# Patient Record
Sex: MALE
[De-identification: names, ages, dates, MRNs, and addresses within clinical notes are randomized per-mention and may not be internally consistent; named-entity substitution may affect disease eponyms.]

---

## 2019-02-27 ENCOUNTER — HOSPITAL ENCOUNTER (INPATIENT)
Dept: HOSPITAL 92 - ERS | Age: 19
LOS: 6 days | Discharge: HOME | DRG: 853 | End: 2019-03-05
Attending: FAMILY MEDICINE | Admitting: FAMILY MEDICINE
Payer: SELF-PAY

## 2019-02-27 VITALS — BODY MASS INDEX: 34.7 KG/M2

## 2019-02-27 DIAGNOSIS — E66.9: ICD-10-CM

## 2019-02-27 DIAGNOSIS — E87.6: ICD-10-CM

## 2019-02-27 DIAGNOSIS — J18.9: ICD-10-CM

## 2019-02-27 DIAGNOSIS — J86.9: ICD-10-CM

## 2019-02-27 DIAGNOSIS — J96.01: ICD-10-CM

## 2019-02-27 DIAGNOSIS — R65.20: ICD-10-CM

## 2019-02-27 DIAGNOSIS — K76.0: ICD-10-CM

## 2019-02-27 DIAGNOSIS — A41.9: Primary | ICD-10-CM

## 2019-02-27 DIAGNOSIS — G47.33: ICD-10-CM

## 2019-02-27 DIAGNOSIS — M94.0: ICD-10-CM

## 2019-02-27 DIAGNOSIS — E86.0: ICD-10-CM

## 2019-02-27 LAB
ALBUMIN SERPL BCG-MCNC: 4.6 G/DL (ref 3.5–5)
ALP SERPL-CCNC: 84 U/L (ref 40–150)
ALT SERPL W P-5'-P-CCNC: 35 U/L (ref 8–55)
ANION GAP SERPL CALC-SCNC: 18 MMOL/L (ref 10–20)
AST SERPL-CCNC: 16 U/L (ref 5–34)
BICARBONATE (HCO3V): 23 MMOL/L (ref 22–28)
BILIRUB SERPL-MCNC: 2.1 MG/DL (ref 0.2–1.2)
BUN SERPL-MCNC: 16 MG/DL (ref 8.9–20.6)
CA-I BLD-SCNC: 1.06 MMOL/L
CALCIUM SERPL-MCNC: 10.6 MG/DL (ref 7.8–10.44)
CHLORIDE SERPL-SCNC: 103 MMOL/L (ref 98–107)
CHLORIDE SERPL-SCNC: 99 MMOL/L (ref 98–107)
CK SERPL-CCNC: 78 U/L (ref 30–200)
CO2 BLDV CALC-SCNC: 24.2 MMOL/L (ref 22–28)
CO2 SERPL-SCNC: 21 MMOL/L (ref 22–29)
CO2 TENSION (PVCO2): 38.3 MMHG (ref 40–50)
CREAT CL PREDICTED SERPL C-G-VRATE: 0 ML/MIN (ref 70–130)
GLOBULIN SER CALC-MCNC: 4.6 G/DL (ref 2.4–3.5)
GLUCOSE SERPL-MCNC: 111 MG/DL (ref 70–105)
HCT VFR BLD CALC: 45 % (ref 42–52)
HEMOGLOBIN - CALC: 15.4 G/DL (ref 14–18)
HGB BLD-MCNC: 16.4 G/DL (ref 14–18)
HIV 1/2 INDEX: 0.42 S/CO (ref ?–1)
LIPASE SERPL-CCNC: (no result) U/L (ref 8–78)
MCH RBC QN AUTO: 30.3 PG (ref 27–31)
MCV RBC AUTO: 91.3 FL (ref 78–98)
MDIFF COMPLETE?: YES
O2 TENSION (PVO2): 30.6 MMHG (ref 35–45)
PLATELET # BLD AUTO: 323 THOU/UL (ref 130–400)
POTASSIUM SERPL-SCNC: 3.8 MMOL/L (ref 3.5–5.1)
POTASSIUM SERPL-SCNC: 3.8 MMOL/L (ref 3.5–5.1)
RBC # BLD AUTO: 5.39 MILL/UL (ref 4.7–6.1)
S PNEUM AG UR QL: NEGATIVE
SAO2 % BLDV FROM PO2: 58.2 % (ref 60–85)
SODIUM SERPL-SCNC: 134 MMOL/L (ref 136–145)
SODIUM SERPL-SCNC: 136 MMOL/L (ref 138–145)
SP GR UR STRIP: 1.05 (ref 1–1.04)
WBC # BLD AUTO: 20.7 THOU/UL (ref 4.8–10.8)

## 2019-02-27 PROCEDURE — 76705 ECHO EXAM OF ABDOMEN: CPT

## 2019-02-27 PROCEDURE — 81003 URINALYSIS AUTO W/O SCOPE: CPT

## 2019-02-27 PROCEDURE — 83605 ASSAY OF LACTIC ACID: CPT

## 2019-02-27 PROCEDURE — 82330 ASSAY OF CALCIUM: CPT

## 2019-02-27 PROCEDURE — 71250 CT THORAX DX C-: CPT

## 2019-02-27 PROCEDURE — 36416 COLLJ CAPILLARY BLOOD SPEC: CPT

## 2019-02-27 PROCEDURE — 83690 ASSAY OF LIPASE: CPT

## 2019-02-27 PROCEDURE — 93306 TTE W/DOPPLER COMPLETE: CPT

## 2019-02-27 PROCEDURE — 87086 URINE CULTURE/COLONY COUNT: CPT

## 2019-02-27 PROCEDURE — 87040 BLOOD CULTURE FOR BACTERIA: CPT

## 2019-02-27 PROCEDURE — 84145 PROCALCITONIN (PCT): CPT

## 2019-02-27 PROCEDURE — 80053 COMPREHEN METABOLIC PANEL: CPT

## 2019-02-27 PROCEDURE — 87149 DNA/RNA DIRECT PROBE: CPT

## 2019-02-27 PROCEDURE — 94640 AIRWAY INHALATION TREATMENT: CPT

## 2019-02-27 PROCEDURE — 87899 AGENT NOS ASSAY W/OPTIC: CPT

## 2019-02-27 PROCEDURE — 85025 COMPLETE CBC W/AUTO DIFF WBC: CPT

## 2019-02-27 PROCEDURE — 71046 X-RAY EXAM CHEST 2 VIEWS: CPT

## 2019-02-27 PROCEDURE — 71275 CT ANGIOGRAPHY CHEST: CPT

## 2019-02-27 PROCEDURE — 80048 BASIC METABOLIC PNL TOTAL CA: CPT

## 2019-02-27 PROCEDURE — 80306 DRUG TEST PRSMV INSTRMNT: CPT

## 2019-02-27 PROCEDURE — 87389 HIV-1 AG W/HIV-1&-2 AB AG IA: CPT

## 2019-02-27 PROCEDURE — 96361 HYDRATE IV INFUSION ADD-ON: CPT

## 2019-02-27 PROCEDURE — 93005 ELECTROCARDIOGRAM TRACING: CPT

## 2019-02-27 PROCEDURE — 36415 COLL VENOUS BLD VENIPUNCTURE: CPT

## 2019-02-27 PROCEDURE — 82550 ASSAY OF CK (CPK): CPT

## 2019-02-27 PROCEDURE — 87804 INFLUENZA ASSAY W/OPTIC: CPT

## 2019-02-27 PROCEDURE — 82803 BLOOD GASES ANY COMBINATION: CPT

## 2019-02-27 PROCEDURE — 87205 SMEAR GRAM STAIN: CPT

## 2019-02-27 PROCEDURE — 71045 X-RAY EXAM CHEST 1 VIEW: CPT

## 2019-02-27 PROCEDURE — 87070 CULTURE OTHR SPECIMN AEROBIC: CPT

## 2019-02-27 PROCEDURE — 96365 THER/PROPH/DIAG IV INF INIT: CPT

## 2019-02-27 PROCEDURE — 96375 TX/PRO/DX INJ NEW DRUG ADDON: CPT

## 2019-02-27 NOTE — RAD
SINGLE VIEW CHEST:

 

HISTORY:

Dyspnea and back pain.

 

COMPARISON:

01/12/2012

 

FINDINGS:

A single view of the chest shows a normal sized cardiomediastinal silhouette.  Low lung volumes are p
resent.  There is elevation of the right hemidiaphragm.  There is no evidence of consolidation, mass,
 or pleural effusion.

 

IMPRESSION:

No evidence of acute cardiopulmonary disease.

 

POS: [facility]

## 2019-02-27 NOTE — ULT
GALLBLADDER ULTRASOUND:

 

Date:  02/27/19 

 

HISTORY:  

Right upper quadrant pain. 

 

FINDINGS:

Real-time imaging of the right upper quadrant was performed. This shows a normal appearing gallbladde
r. No gallstones identified. The common duct is 2-3 mm. Liver measures approximately 18.0 cm in lengt
h and is of diffuse increased echogenicity. There is some focal fatty sparing adjacent to the gallbla
dder. Right kidney is normal in size and not obstructed. The pancreas is totally obscured. 

 

IMPRESSION: 

Diffuse fatty change of the liver. 

 

 

POS: [facility]

## 2019-02-27 NOTE — PDOC.EVN
Event Note





- Event Note


Event Note: 


Date/Time: 02/27/19 7675





I personally evaluated the patient and discussed the management with Dr. Mckee


I agree with the History, Examination, Assessment and Plan documented above 

with any addition or exceptions noted below- 18 yo M with no PMH presents with 

2 week hx of cough productive of white sputum and 2-3 day history of lower 

posterior chest wall pain. States he had 1 episode of vomiting on Sunday and 

has not felt hungry or thirsty since then. UTD vaccines. Was seen in urgent 

care yesterday and diagnosed with low back pain and given NSAIDs and muscle 

relaxers for pain with no relief. No ill contacts. Subj fevers. PMH/PSH/All/

Meds reviewed and agree with resident's documentation. Tm 101.4 in ER   p148  

/58  RR24  90% RA Exam repeated by me and agree with resident's findings. 

Labs: WBC=20.7, H/H=16.4/49.2, Sis=443, GN=475, K=3.8, CL=99, CO2=21, BUN/Cr=16/

1.13, Gluc=111, Lactic acid=3.8 -> 2.8, AST/ALT=16/35, Procal=1.05, Urine Strep 

pneumo Ag (-); CTA chest- mild right pleural effusion, RLL infiltrate. A/P: 1) 

Sepsis secondary to CAP- Continue IVF. Continue Vanc/cefepime. Blood, urine 

cultures pending. Continue nebs.

## 2019-02-27 NOTE — PDOC.FPRHP
- History of Present Illness


Chief Complaint: dyspnea, fever


History of Present Illness: 





18 yo M with no PMH presents for dyspnea and fever. Patient states that it 

began 2 weeks ago with cough and congestion. 4 days ago, he had a sharp pain in 

his back, so he went to  [facility] and was given muscle relaxers which helped 

some. He also starting having post-tussive vomiting. He reports he did not 

urinate at all yesterday. His cough and dyspnea have been worsening, so much so 

that today he has pleuritic chest pain worse on his right side (anterior, 

lateral, and posterior). 





On presentation to ED, he was tachypneic to 40s, febrile 100.5, tachycardic to 

170s, with a high WBC of 20.7. LA was elevated at 3.8. CT chest showed RLL 

pneumonia and pleural effusion. He was given Vanc, zosyn, tylenol, 4L NS, UA 

neg. CT chest ruled out PE. 





- Allergies/Adverse Reactions


 Allergies











Allergy/AdvReac Type Severity Reaction Status Date / Time


 


No Known Drug Allergies Allergy   Verified 02/27/19 21:49














- Home Medications


 











 Medication  Instructions  Recorded  Confirmed  Type


 


Acetaminophen With Codeine 1 tablet PO Q6HR PRN 02/27/19 02/27/19 History





[Tylenol with Codeine #3]    


 


Cyclobenzaprine [Flexeril] 10 mg PO TID PRN 02/27/19 02/27/19 History














- History


PMHx: None


 


PSHx: None





FHx: No heart disease, DM or cancer


 


Social: Denies tobacco, alcohol, or drug use


 








- Review of Systems


General: reports: fever/chills, weight/appetite/sleep changes (decreased 

appetite)


ENT: reports: other (sore throat).  denies: nasal congestion, rhinorrhea


Respiratory: reports: cough, congestion, shortness of breath


Cardiovascular: reports: chest pain.  denies: palpitation, edema


Gastrointestinal: reports: vomiting.  denies: nausea, diarrhea, constipation, 

abdominal pain, GI bleeding


Genitourinary: denies: dysuria, other (hematuria)


Skin: denies: rashes, lesions


Musculoskeletal: reports: pain (back pain).  denies: tenderness


Neurological: reports: numbness (chest numbness on right side).  denies: 

weakness


Psychological: denies: anxiety, depression





- Vital signs


BP: [128/76]  HR: [135] RR: [48] Tmax: [100.5] Pox: [96]% on [RA]  Wt: [107.1] 

  








- Physical Exam


Constitutional: awake, alert and oriented, well developed, other (tachypneic, 

diaphoretic)


HEENT: normocephalic and atraumatic, PERRLA, EOMI


Neck: supple, trachea midline


Heart: normal S1/S2, no murmurs/rubs/gallops, pulses present, no edema, other (

tachycardic)


Lungs: CTAB, no rales/rhonchi, no wheezing


Abdomen: soft, non-tender, bowel sounds present, no masses/distention


Musculoskeletal: normal structure, normal tone, ROM grossly normal


Neurological: no focal deficit, CN II-XII intact


Skin: good turgor (s/p 3L fluids), capillary refill <2 seconds


Heme/Lymphatic: no unusual bruising or bleeding, no purpura


Psychiatric: normal mood and affect, good judgment and insight, intact recent 

and remote memory





FMR H&P: Results





- Labs


Result Diagrams: 


 02/27/19 14:40





 02/27/19 14:40


Lab results: 


 











WBC  20.7 thou/uL (4.8-10.8)  H  02/27/19  14:40    


 


Hgb  16.4 g/dL (14.0-18.0)   02/27/19  14:40    


 


Hct  49.2 % (42.0-52.0)   02/27/19  14:40    


 


MCV  91.3 fL (78.0-98.0)   02/27/19  14:40    


 


Plt Count  323 thou/uL (130-400)   02/27/19  14:40    


 


Band Neuts % (Manual)  5 % (5-11)   02/27/19  14:40    


 


VBG pCO2  38.3 mmHg (40.0-50.0)  L  02/27/19  17:47    


 


VBG pO2  30.6 mmHg (35.0-45.0)  L  02/27/19  17:47    


 


Sodium  134 mmol/L (136-145)  L  02/27/19  14:40    


 


Potassium  3.8 mmol/L (3.5-5.1)   02/27/19  14:40    


 


Chloride  99 mmol/L ()   02/27/19  14:40    


 


Carbon Dioxide  21 mmol/L (22-29)  L  02/27/19  14:40    


 


BUN  16 mg/dL (8.9-20.6)   02/27/19  14:40    


 


Creatinine  1.13 mg/dL (0.7-1.3)   02/27/19  14:40    


 


Glucose  111 mg/dL ()  H  02/27/19  14:40    


 


Lactic Acid  3.8 mmol/L (0.5-2.2)  H  02/27/19  14:40    


 


Calcium  10.6 mg/dL (7.8-10.44)  H  02/27/19  14:40    


 


Total Bilirubin  2.1 mg/dL (0.2-1.2)  H  02/27/19  14:40    


 


AST  16 U/L (5-34)   02/27/19  14:40    


 


ALT  35 U/L (8-55)   02/27/19  14:40    


 


Alkaline Phosphatase  84 U/L ()   02/27/19  14:40    


 


Creatine Kinase  78 U/L ()   02/27/19  14:38    


 


Serum Total Protein  9.2 g/dL (6.0-8.3)  H  02/27/19  14:40    


 


Albumin  4.6 g/dL (3.5-5.0)   02/27/19  14:40    


 


Lipase  Less than  4 U/L (8-78)  L  02/27/19  14:38    


 


Urine Ketones  15 mg/dL (Negative)  H  02/27/19  17:17    


 


Urine Blood  Negative  (Negative)   02/27/19  17:17    


 


Urine Nitrite  Negative  (Negative)   02/27/19  17:17    


 


Ur Leukocyte Esterase  Negative  (Negative)   02/27/19  17:17    














- EKG Interpretation


EKG: 





Sinus tach





- Radiology Interpretation


  ** Chest x-ray


Status: image reviewed by me, report reviewed by me


Additional comment: 





RLL pneumonia, elevated diaphram





  ** CT scan - chest


Status: image reviewed by me, report reviewed by me


Additional comment: 





RLL pneumonia, no PE, Rt pleural effusion





FMR H&P: A/P





- Problem List


(1) Sepsis due to pneumonia


Current Visit: Yes   Status: Acute   Priority: High   Code(s): J18.9 - PNEUMONIA

, UNSPECIFIED ORGANISM; A41.9 - SEPSIS, UNSPECIFIED ORGANISM   





(2) Acute hypoxemic respiratory failure


Current Visit: Yes   Status: Acute   Code(s): J96.01 - ACUTE RESPIRATORY 

FAILURE WITH HYPOXIA   





(3) Costochondral chest pain


Current Visit: Yes   Status: Acute   Code(s): R07.1 - CHEST PAIN ON BREATHING   





(4) Fatty liver


Current Visit: Yes   Status: Acute   Code(s): K76.0 - FATTY (CHANGE OF) LIVER, 

NOT ELSEWHERE CLASSIFIED   





(5) Dehydration


Current Visit: Yes   Status: Acute   Priority: High   Code(s): E86.0 - 

DEHYDRATION   





- Plan





#1 Acute Hypoxemic Respiratory Failure 2/2 Sepsis due to RLL Pneumonia


- On presentation to ED, he was tachypneic to 40s, febrile 100.5, tachycardic 

to 170s, with a high WBC of 20.7. LA was elevated at 3.8. VBG showed low O2.


- CXR/CT chest showed RLL pneumonia and pleural effusion. CT chest showed no 

PE. 


- Continue Vanc, zosyn


- Consider switching antibiotics to azithro/rocephin or doxycycline once VSS 

and tolerating PO


- S/p 4L NS. Continue MIVF LR @ 150


- UA neg


- LA trending down


- Procal 1.05, continue to trend


- Strep ag neg


- Bl and urine cx collected


- Continue to monitor vitals closely


- AM BMP/CBC


- duonebs q4h ROCHELLE, PRN q2h


- HIV neg, UDS pending





#2 Costochondritis due to #1


- 1 dose toradol, then  q6h





#Moderate dehydration


- Contributing to his tachycardia


- Zofran for nausea


- S/p 4L as above, on MIVF. Tachycardia improving


- Decreased output. Continue to monitor. BUN/Cr wnl





#4 Fatty Liver


- Seen on sono,  dietary change


- Liver enzymes wnl





Diet: Regular


DVT ppx: lovenox


GI ppx: ranitidine


Code status: full


PCP: none





Nicolette Rodriguez MD PGY-1


 





FMR H&P: Upper Level





- Pertinent history





18 yo HM no PMH presents with 2 week hx of cough and 2-3 day history of lower 

posterior chest wall pain. States he had 1 episode of vomiting on Sunday and 

has not felt hungry or thirsty since then. UTD vaccines. Was seen in urgent 

care yesterday and diagnosed with low back pain and given NSAIDs and muscle 

relaxers for pain. 





ER:  Labs, EKG, CXR, CTA chest, NS x4L, Vanc, Zosyn. We ordered a duoneb in the 

ER and patient states he felt like he could breath easier after Neb. Still 

having pain so ordered dose of toradol 30 mg IV. 





- Pertinent findings





Vitals Pulse 143, Resp 40, SpO2 94% on RA. 


Gen: Mild resp distress.  A&Ox4. Speaks short sentences due to pain. 


CV: Tachy regular, no murmur


Pulm: CTA-B. No retractions. tachypneic 





Labs: Lactic Acid 3.8-> 2.8. Urine specific gravity 1.049, WBC 20.7





RUQ US: Fatty liver changes. 





CTA-Chest: mild to moderate right pleural effusions, RLL infiltrate 





- Plan


Date/Time: 02/27/19 1830








I, Reagan Ritter MD, have evaluated this patient and agree with findings/plan as 

outlined by intern resident. Pertinent changes/additions are listed here.





1. Acute Hypoxemic Respiratory distress 2/2 RLL CAP: Checking procalcitonin and 

urine strep antigen. Per house supervisor, patient is not stable for medical 

floor so will be placed on telemetry. Patient has no cardiac history and is 

likely tachycardic and tachypneic due to volume depletion and MSK chest pain. 

Will schedule duonebs q4hr with q2hr PRN albuterol Continue Vanc and zosyn. 

Once clinically improved, will consider switching to azithro/rocephin or 

doxycycline. 


2. Sepsis 2/2 #1: Lactic acid trending down. LR at 150 mL/hr. 


3. MSK chest pain: PRN ibuprofen. Given toradol in ER. likely contributing to 

tachypnea. 


4. Diet: regular


5. PPx: walking program


6. CODE: FULL





Dispo: tele, inpatient >2 midnights. 





Discussed with Dr. Lunsford.

## 2019-02-27 NOTE — CT
CT ANGIO OF CHEST PERFORMED WITH INTRAVENOUS CONTRAST ENHANCEMENT WITH 3D RECONSTRUCTIONS:

2/27/19

 

HISTORY: 

Chest pain. Shortness of breath.

 

There is mild to moderate sized right sided pleural effusion. There is atelectatic changes in the pos
terior basal segment of the right lower lobe but also there is parenchymal change more in the superio
r segment of the right lower lobe which could be part of the atelectasis but has more of an infiltrat
star appearance. There is linear atelectatic change in the left base without any significant effusion.
 

 

No significant mediastinal, hilar or axillary adenopathy. The thoracic aorta is normal in caliber. 

 

The pulmonary artery opacification is limited. I do not see any proximal embolus but more peripheral 
emboli are not excluded. 

 

Marked fatty changes of the liver are visualized. Visualized portions of the pancreas which is only t
he pancreatic tail is normal. 

 

IMPRESSION:  

1.      Mild right sided pleural effusion. There is atelectatic changes in the right base but more in
 the superior segment of the right lower lobe. There is changes of either atelectasis versus infiltra
te. The changes in the left base are more suggestive of typical atelectasis. 

2.      Diffuse fatty changes of the visualized portions of the liver. 

3.      Limited examination. No signs of any proximal pulmonary embolus. 

 

POS: [facility]

## 2019-02-28 LAB
ANION GAP SERPL CALC-SCNC: 14 MMOL/L (ref 10–20)
BUN SERPL-MCNC: 14 MG/DL (ref 8.9–20.6)
CALCIUM SERPL-MCNC: 9.3 MG/DL (ref 7.8–10.44)
CHLORIDE SERPL-SCNC: 102 MMOL/L (ref 98–107)
CO2 SERPL-SCNC: 22 MMOL/L (ref 22–29)
CREAT CL PREDICTED SERPL C-G-VRATE: 182 ML/MIN (ref 70–130)
DRUG SCREEN CUTOFF: (no result)
GLUCOSE SERPL-MCNC: 111 MG/DL (ref 70–105)
HGB BLD-MCNC: 13.9 G/DL (ref 14–18)
MCH RBC QN AUTO: 30.6 PG (ref 27–31)
MCV RBC AUTO: 92.5 FL (ref 78–98)
MDIFF COMPLETE?: YES
MEDTOX CONTROL LINE VALID?: (no result)
MEDTOX READER #: (no result)
PLATELET # BLD AUTO: 230 THOU/UL (ref 130–400)
POTASSIUM SERPL-SCNC: 3.9 MMOL/L (ref 3.5–5.1)
RBC # BLD AUTO: 4.54 MILL/UL (ref 4.7–6.1)
SODIUM SERPL-SCNC: 134 MMOL/L (ref 136–145)
WBC # BLD AUTO: 21.7 THOU/UL (ref 4.8–10.8)

## 2019-02-28 RX ADMIN — ACETAMINOPHEN AND CODEINE PHOSPHATE PRN TAB: 300; 30 TABLET ORAL at 23:37

## 2019-02-28 RX ADMIN — VANCOMYCIN HYDROCHLORIDE SCH MLS: 1 INJECTION, POWDER, LYOPHILIZED, FOR SOLUTION INTRAVENOUS at 12:29

## 2019-02-28 RX ADMIN — VANCOMYCIN HYDROCHLORIDE SCH MLS: 1 INJECTION, POWDER, LYOPHILIZED, FOR SOLUTION INTRAVENOUS at 01:17

## 2019-02-28 NOTE — PDOC.FM
- Subjective


Subjective: 





Patient seen at bedside this morning resting comfortably. he states that he is 

feeling much better this morning. No acute events since admission. No new 

complaints





- Objective


MAR Reviewed: Yes


Vital Signs & Weight: 


 Vital Signs (12 hours)











  Temp Pulse Resp BP Pulse Ox


 


 02/28/19 07:24      94 L


 


 02/28/19 07:23   127 H  20   94 L


 


 02/28/19 04:00  99.7 F H  136 H  18  113/58 L  95


 


 02/28/19 02:21   84  20   95


 


 02/28/19 00:00  99.5 F  144 H  20  109/52 L  95


 


 02/27/19 23:12   87  20   97


 


 02/27/19 21:43  99.5 F  148 H  22 H  113/58 L  90 L








 Weight











Weight                         106.594 kg














I&O: 


 











 02/27/19 02/28/19 03/01/19





 06:59 06:59 06:59


 


Intake Total  2600 


 


Output Total  300 


 


Balance  2300 











Result Diagrams: 


 02/28/19 04:30





 02/28/19 04:30





Phys Exam





- Physical Examination


Constitutional: NAD


HEENT: moist MMs


Neck: full ROM


Rales in RML and RLL


Cardiovascular: no significant murmur


Tachycardia 


Gastrointestinal: soft, non-tender, no distention, positive bowel sounds


Musculoskeletal: no edema


Neurological: moves all 4 limbs


Psychiatric: A&O x 3


Skin: no rash, normal turgor





Dx/Plan


(1) Acute hypoxemic respiratory failure


Code(s): J96.01 - ACUTE RESPIRATORY FAILURE WITH HYPOXIA   Status: Acute   





(2) Costochondral chest pain


Code(s): R07.1 - CHEST PAIN ON BREATHING   Status: Acute   





(3) Dehydration


Code(s): E86.0 - DEHYDRATION   Status: Acute   





(4) Fatty liver


Code(s): K76.0 - FATTY (CHANGE OF) LIVER, NOT ELSEWHERE CLASSIFIED   Status: 

Chronic   





(5) Sepsis due to pneumonia


Code(s): J18.9 - PNEUMONIA, UNSPECIFIED ORGANISM; A41.9 - SEPSIS, UNSPECIFIED 

ORGANISM   Status: Acute   





- Plan


Plan: 





1. Acute Hypoxemic Respiratory Failure 2/2 Sepsis due to RLL Pneumonia


- Patient appears to be stable at this time. Normal O2 sat and respiratory rate

, though tachycardia persists. 


- CXR/CT chest showed RLL pneumonia and pleural effusion. CT chest showed no 

PE. 


- Plan to de escalate abx today.  


- Patient has adequate fluid resuscitation and taking PO well, consider dc'ing 

IVF when urinary output improves 


- Continue to trend lactic acid and procal


- Strep ag neg


- Bl and urine cx collected


- duonebs q4h ROCHELLE, PRN q2h





2. Costochondritis


- prn oral NSAIDs





3. Moderate dehydration


- improving, continue to monitor UOP





4. Fatty Liver


- incidental finding,  out weight loss. Will need outpatient work up 





Dispo: patient stable and doing well. Likely stable to move off of tele today. 

LOS 24-48 hours. 











Addendum - Attending





- Attending Attestation


Date/Time: 02/28/19 1106





I personally evaluated the patient and discussed the management with Dr. Nielsen.


I agree with the History, Examination, Assessment and Plan documented above 

with any addition or exceptions noted below.


Patient symptomatically feels better and almost near baseline.


RLL Pneumonia on CT Scan- Continue Vanc and Zosyn through 1300 dose and then 

deescalate abx.


1/2 + blood cx for coag neg staph- most likely skin contaminant.


Dehydration- improved after 4L bolus.  On maintanence fluid now. good uop by 

history


Fatty liver- dietary counseling.


Tachycardia- sinus tach- no EKG changes and patient asymptomatic. Most likely 

exacerbated by albuterol.  Continue to monitor on tele.

## 2019-03-01 LAB
ANION GAP SERPL CALC-SCNC: 18 MMOL/L (ref 10–20)
BASOPHILS # BLD AUTO: 0.1 THOU/UL (ref 0–0.2)
BASOPHILS NFR BLD AUTO: 0.6 % (ref 0–1)
BUN SERPL-MCNC: 13 MG/DL (ref 8.9–20.6)
CALCIUM SERPL-MCNC: 9.1 MG/DL (ref 7.8–10.44)
CHLORIDE SERPL-SCNC: 105 MMOL/L (ref 98–107)
CO2 SERPL-SCNC: 15 MMOL/L (ref 22–29)
CREAT CL PREDICTED SERPL C-G-VRATE: 182 ML/MIN (ref 70–130)
EOSINOPHIL # BLD AUTO: 0.2 THOU/UL (ref 0–0.7)
EOSINOPHIL NFR BLD AUTO: 1.8 % (ref 0–10)
GLUCOSE SERPL-MCNC: 90 MG/DL (ref 70–105)
HGB BLD-MCNC: 13 G/DL (ref 14–18)
LYMPHOCYTES # BLD: 0.9 THOU/UL (ref 1.2–3.4)
LYMPHOCYTES NFR BLD AUTO: 6.5 % (ref 21–51)
MCH RBC QN AUTO: 30.2 PG (ref 27–31)
MCV RBC AUTO: 90.3 FL (ref 78–98)
MONOCYTES # BLD AUTO: 1.2 THOU/UL (ref 0.11–0.59)
MONOCYTES NFR BLD AUTO: 8.6 % (ref 0–10)
NEUTROPHILS # BLD AUTO: 11.2 THOU/UL (ref 1.4–6.5)
NEUTROPHILS NFR BLD AUTO: 82.5 % (ref 42–75)
PLATELET # BLD AUTO: 225 THOU/UL (ref 130–400)
POTASSIUM SERPL-SCNC: 5.4 MMOL/L (ref 3.5–5.1)
RBC # BLD AUTO: 4.3 MILL/UL (ref 4.7–6.1)
SODIUM SERPL-SCNC: 133 MMOL/L (ref 136–145)
WBC # BLD AUTO: 13.5 THOU/UL (ref 4.8–10.8)

## 2019-03-01 RX ADMIN — ACETAMINOPHEN AND CODEINE PHOSPHATE PRN TAB: 300; 30 TABLET ORAL at 19:48

## 2019-03-01 NOTE — RAD
TWO VIEWS OF THE CHEST:

 

COMPARISON: 

2/27/2019.

 

HISTORY: 

Right lower lobe pneumonia.

 

FINDINGS: 

Two views of the chest show a normal-size cardiomediastinal silhouette.  There is elevation of the ri
ght hemidiaphragm.  A moderate right pleural effusion is seen.  Adjacent atelectasis versus infiltrat
e may be present.

 

IMPRESSION: 

Moderate right pleural effusion with adjacent atelectasis versus infiltrate.

 

POS: [facility]

## 2019-03-01 NOTE — CON
DATE OF CONSULTATION:  03/01/2019



SERVICE:  Pulmonary Medicine.



REASON FOR CONSULTATION:  Pneumonia.



HISTORY OF PRESENT ILLNESS:  The patient is a 19-year-old  male with 
past

medical history significant for some pleuritic chest discomfort.  Ultimately, he

presented to the emergency department was discovered to have a pneumonia.  He 
was

admitted to the hospital on the 27th of February.  Repeat chest x-ray was 
performed

demonstrating increasing opacification in the right lung, and likely an 
increasing

pleural effusion.  I was subsequently consulted.  Since he has been in the 
hospital,

he has had a significant improvement in symptoms.  He denies any current chest 
pain,

fevers, chills, nausea, or vomiting.  Otherwise, he is returning to his usual 
state

of health. 



PAST MEDICAL HISTORY:  

1. Obesity.

2. Obstructive sleep apnea, suspected.



PAST SURGICAL HISTORY:  None.



FAMILY HISTORY:  Noncontributory.



SOCIAL HISTORY:  Negative for alcohol, tobacco, or illicit drug use.  He works 
in

the oil field.  He denies any exposure to chemicals, dust, asbestos, or

tuberculosis. 



ALLERGIES:  NO KNOWN DRUG ALLERGIES.



MEDICATIONS:  List of his inpatient medications was reviewed.  No specific 
updates

were made at this time.  Up until recently, he was on vancomycin, and Zosyn.  
He was

just recently de-escalated to Rocephin and doxycycline. 



REVIEW OF SYSTEMS:  General, head, ears, eyes, nose, throat, cardiovascular,

respiratory, GI, , musculoskeletal, neurologic, and skin are negative except 
as

mentioned in the HPI. 



PHYSICAL EXAMINATION:

VITAL SIGNS:  Afebrile, pulse 118, blood pressure 120/69, respirations 16, and

saturation 90% on room air. 

GENERAL:  The patient is awake and alert, in no apparent distress. 

LUNGS:  Decent air entry.  Rhonchi and crackles are present in the right lung.  
No

prolonged expiratory phase or wheezing is otherwise appreciated. 

HEART:  Normal rate and regular. 

ABDOMEN:  Soft, nontender, and nondistended.  Bowel sounds are positive. 

MUSCULOSKELETAL:  No cyanosis or clubbing.  No pitting in the bilateral lower

extremities. 

NEUROLOGIC:  Grossly nonfocal.



LABORATORY DATA:  WBC 13.5, hemoglobin 13.0, platelets 225,000.  Sodium 133 and

roughly stable.  Potassium 5.4.  Basic metabolic profile is otherwise 
unremarkable.

Lactate was originally 2.8, but is trended downward to 1.6.  Procalcitonin is

elevated at 2.5.  Liver function studies are unremarkable.  Urinalysis is 
positive

for tricyclics and opiates, in keeping with what he was given in the outpatient

setting.  HIV was nonreactive.  Strep pneumoniae antigen is unremarkable.

Coag-negative staph is growing in 1/2 blood cultures.  Influenza A and B are

unremarkable.  Urine culture to date is negative. 



IMAGING STUDIES:  CTA of the chest was without PE.  He has a right lower lobe

infiltrate, and at that time, had a remote pleural effusion.  Unfortunately, on

presentation, it had ever so slight appearance of loculation given that it did 
not

layer fully. 



Abdominal ultrasound demonstrates diffuse fatty change of the liver. 



Repeat chest x-ray demonstrates significant increase in the infiltrate, in the 
size

of the effusion. 



His bedside ultrasound performed by me showed no significant effusion or 
infiltrate

in the left chest.  On the right side, had a very difficult time distinguishing 
my

fluid/tissue planes.  I could not identify the lung.  I could clearly see there 
was a rim

of pleural effusion.  Deep to that, there was a structure that was not mobile.  
That

did not take on the appearance of atelectatic or consolidated lung. I could not 
identify his 

diaphragm.  



ASSESSMENT:  

1. Acute hypoxic respiratory failure, improving.

2. Community-acquired pneumonia.

3. Pleural effusion.

4. Obstructive sleep apnea, suspected.

5. Severe sepsis, improving.



DISCUSSION AND PLAN:  We will repeat a CT of the chest tomorrow morning.  If it

appears that this fluid collection is getting larger, or taking on a loculated

appearance, thoracentesis, and/or cardiothoracic surgery consultation will be

considered depending on what we find.  On my bedside evaluation, I really could 
not

see a large obvious pocket of fluid to get into.  That being said, my suspicion 
is

that this base is actually involved.  Hopefully, we would not see a larger 
pleural

effusion on our CT tomorrow.  The patient understands however that if this 
space is

involved, it will require additional procedures and possibly an operation to 
fix it.

 If there is more loculated appearance, I would restart anaerobic coverage. 



70 minutes have been devoted to this patient in various activities.  I 
personally reviewed all imaging studies and laboratory data noted within this 
document.  For fifty percent of this time, I was interacting with the patient 
at the bedside or coordinating care with the care team.  For the remainder of 
the time I was immediately available to the patient in the hospital unit.  



Job ID:  [id number]



[facility]

## 2019-03-01 NOTE — PDOC.FM
- Subjective


Subjective: 





Seen sitting up in chair this morning in no acute distress. States that he 

generally feels better, but still has SOB when he walks. Denies fever, chills, 

chest pain, or new symptoms. No acute events over night





- Objective


MAR Reviewed: Yes


Vital Signs & Weight: 


 Vital Signs (12 hours)











  Temp Pulse Resp BP Pulse Ox


 


 03/01/19 04:00  97.6 F  90  18  110/57 L  92 L


 


 03/01/19 03:13      94 L


 


 02/28/19 23:07      94 L








 Weight











Weight                         106.594 kg














I&O: 


 











 02/28/19 03/01/19 03/02/19





 06:59 06:59 06:59


 


Intake Total 2600 2400 


 


Output Total 300  


 


Balance 2300 2400 











Result Diagrams: 


 03/01/19 06:13





 03/01/19 05:27


Additional Labs: 





 Laboratory Tests











  03/01/19





  06:13


 


Procalcitonin  2.59











EKG Reviewed by me: Yes





Phys Exam





- Physical Examination


Constitutional: NAD


HEENT: sclera anicteric


Neck: full ROM


Rales in RML and RLL, Left side clear


Cardiovascular: no significant murmur


Tachycardia


Gastrointestinal: soft, non-tender, no distention, positive bowel sounds


Musculoskeletal: no edema


Neurological: non-focal, moves all 4 limbs


Psychiatric: normal affect, A&O x 3


Skin: no rash, normal turgor





Dx/Plan


(1) Acute hypoxemic respiratory failure


Code(s): J96.01 - ACUTE RESPIRATORY FAILURE WITH HYPOXIA   Status: Acute   





(2) Costochondral chest pain


Code(s): R07.1 - CHEST PAIN ON BREATHING   Status: Acute   





(3) Dehydration


Code(s): E86.0 - DEHYDRATION   Status: Resolved   





(4) Fatty liver


Code(s): K76.0 - FATTY (CHANGE OF) LIVER, NOT ELSEWHERE CLASSIFIED   Status: 

Chronic   





(5) Sepsis due to pneumonia


Code(s): J18.9 - PNEUMONIA, UNSPECIFIED ORGANISM; A41.9 - SEPSIS, UNSPECIFIED 

ORGANISM   Status: Acute   





(6) Parapneumonic effusion


Code(s): J18.9 - PNEUMONIA, UNSPECIFIED ORGANISM; J91.8 - PLEURAL EFFUSION IN 

OTHER CONDITIONS CLASSIFIED ELSEWHERE   Status: Suspected   





- Plan


Plan: 





1. Acute Hypoxemic Respiratory Failure 2/2 Sepsis due to RLL Pneumonia


- Continues to be stable and oxygenating well on RA. Tachycardia, however, 

persists


- CXR/CT chest shows worsening area of infiltrate and effusion. This is likely 

due to low volume status at time of admission. Will consult pulm for evaluation 

for thoracentesis


- Move abx to Rocephin and Doxy


- Lactic acid WNL


- Procal increased to 2.5 today, will continue to trend


- Strep ag neg


- Blood culture shows 1/2 coagulase neg staph, assumed contaminate. Urine cx 

pending


- duonebs q4h ROCHELLE, PRN q2h





2. Costochondritis


- prn oral NSAIDs





3. Moderate dehydration, resolved





4. Fatty Liver


- incidental finding,  out weight loss. Will need outpatient work up 





5. Parapneumonic effusion, suspected


- pulm consult as above





Dispo: patient stable and doing well. Likely stable to move off of tele today. 

LOS 24-48 hours. 





Addendum - Attending





- Attending Attestation


Date/Time: 03/01/19 1126





I personally evaluated the patient and discussed the management with Dr. Nielsen


I agree with the History, Examination, Assessment and Plan documented above 

with any addition or exceptions noted below.


CAP with worsening parapneumoic effusion.  Patient not requiring O2 at this 

time. Will consult pulmonology.


Tachycardia- persistent despite rehydration. will get ECHO to further evaluate.


Probable LILLIAN (per patient and girlfriends report)


Obesity and Fatty liver- weight loss counseling.

## 2019-03-02 LAB
ANION GAP SERPL CALC-SCNC: 13 MMOL/L (ref 10–20)
BASOPHILS # BLD AUTO: 0 THOU/UL (ref 0–0.2)
BASOPHILS NFR BLD AUTO: 0.4 % (ref 0–1)
BUN SERPL-MCNC: 14 MG/DL (ref 8.9–20.6)
CALCIUM SERPL-MCNC: 9.2 MG/DL (ref 7.8–10.44)
CHLORIDE SERPL-SCNC: 105 MMOL/L (ref 98–107)
CO2 SERPL-SCNC: 22 MMOL/L (ref 22–29)
CREAT CL PREDICTED SERPL C-G-VRATE: 220 ML/MIN (ref 70–130)
EOSINOPHIL # BLD AUTO: 0.3 THOU/UL (ref 0–0.7)
EOSINOPHIL NFR BLD AUTO: 2.9 % (ref 0–10)
GLUCOSE SERPL-MCNC: 102 MG/DL (ref 70–105)
HGB BLD-MCNC: 12.4 G/DL (ref 14–18)
LYMPHOCYTES # BLD: 1 THOU/UL (ref 1.2–3.4)
LYMPHOCYTES NFR BLD AUTO: 8.9 % (ref 21–51)
MCH RBC QN AUTO: 31.1 PG (ref 27–31)
MCV RBC AUTO: 92.6 FL (ref 78–98)
MONOCYTES # BLD AUTO: 1.1 THOU/UL (ref 0.11–0.59)
MONOCYTES NFR BLD AUTO: 10.5 % (ref 0–10)
NEUTROPHILS # BLD AUTO: 8.4 THOU/UL (ref 1.4–6.5)
NEUTROPHILS NFR BLD AUTO: 77.3 % (ref 42–75)
PLATELET # BLD AUTO: 261 THOU/UL (ref 130–400)
POTASSIUM SERPL-SCNC: 3.2 MMOL/L (ref 3.5–5.1)
RBC # BLD AUTO: 3.98 MILL/UL (ref 4.7–6.1)
SODIUM SERPL-SCNC: 137 MMOL/L (ref 136–145)
WBC # BLD AUTO: 10.8 THOU/UL (ref 4.8–10.8)

## 2019-03-02 NOTE — PDOC.FM
- Subjective


Subjective: 





Seen at bedside this morning. Patient sitting up in no acute distress. Notes 

that symptoms of SOB and cough are improving. No acute events over night





- Objective


MAR Reviewed: Yes


Vital Signs & Weight: 


 Vital Signs (12 hours)











  Temp Pulse Resp BP BP Pulse Ox


 


 03/02/19 04:58       91 L


 


 03/02/19 04:35  97.8 F  107 H  20   127/59 L  97


 


 03/01/19 20:00  99.8 F H  123 H  18  134/76   93 L








 Weight











Weight                         106.594 kg














I&O: 


 











 02/28/19 03/01/19 03/02/19





 06:59 06:59 06:59


 


Intake Total 2600 2400 


 


Output Total 300  


 


Balance 2300 2400 











Result Diagrams: 


 03/02/19 05:26





 03/02/19 05:26





Phys Exam





- Physical Examination


Constitutional: NAD


HEENT: moist MMs


Neck: full ROM


Rales in RLL and RML, no change


Cardiovascular: RRR, no significant murmur


Gastrointestinal: soft, non-tender, no distention


Musculoskeletal: no edema


Neurological: moves all 4 limbs


Psychiatric: normal affect, A&O x 3


Skin: no rash





Dx/Plan


(1) Acute hypoxemic respiratory failure


Code(s): J96.01 - ACUTE RESPIRATORY FAILURE WITH HYPOXIA   Status: Acute   





(2) Costochondral chest pain


Code(s): R07.1 - CHEST PAIN ON BREATHING   Status: Acute   





(3) Dehydration


Code(s): E86.0 - DEHYDRATION   Status: Resolved   





(4) Fatty liver


Code(s): K76.0 - FATTY (CHANGE OF) LIVER, NOT ELSEWHERE CLASSIFIED   Status: 

Chronic   





(5) Sepsis due to pneumonia


Code(s): J18.9 - PNEUMONIA, UNSPECIFIED ORGANISM; A41.9 - SEPSIS, UNSPECIFIED 

ORGANISM   Status: Acute   





(6) Parapneumonic effusion


Code(s): J18.9 - PNEUMONIA, UNSPECIFIED ORGANISM; J91.8 - PLEURAL EFFUSION IN 

OTHER CONDITIONS CLASSIFIED ELSEWHERE   Status: Suspected   





(7) Hypokalemia


Code(s): E87.6 - HYPOKALEMIA   Status: Acute   





- Plan


Plan: 





1. Acute Hypoxemic Respiratory Failure 2/2 Sepsis due to RLL Pneumonia


- Continues to be stable and oxygenating well on RA. Tachycardia is improving, 

however still over 100. 


- Patient was seen by Dr Daly yesterday, there is some concern for loculated 

effusion. Patient will have CT today to evaluate. 


- Add back anaerobic coverage today


- Strep ag neg


- Blood culture shows 1/2 coagulase neg staph, assumed contaminate. Urine cx 

negative 


- duonebs PRN q2h





2. Costochondritis


- prn oral NSAIDs





3. Moderate dehydration, resolved





4. Fatty Liver


- incidental finding,  out weight loss. Will need outpatient work up 





5. Parapneumonic effusion, suspected


- CT today





6. Hypokalemia 


- replace PO





Dispo: patient stable, further hospital course will be dictated by results of 

CT today. 





Addendum - Attending





- Attending Attestation


Date/Time: 03/02/19 4463





I personally evaluated the patient and discussed the management with Dr. Nielsen.


I agree with the History, Examination, Assessment and Plan documented above 

with any addition or exceptions noted below.


The patient was seen this morning sitting up in a chair and stated he felt a 

little better.  CT scan ordered and now shows a loculated effusion.  Consulting 

CV surgery for possible decortication.  Will continue antibiotics.

## 2019-03-02 NOTE — PRG
DATE OF SERVICE:  03/02/2019



SERVICE:  Pulmonary Medicine.



INTERVAL HISTORY:  The patient is doing okay from respiratory standpoint.  He went

down for a CT of the chest this morning.  Unfortunately, it looked terrible.  As

such, we will ask Cardiothoracic Surgery come by and visit with him.  He feels a

little bit better.  He is a little sleepy today.  He denies any chest pain, nausea,

vomiting, fevers, or chills. 



PHYSICAL EXAMINATION:

VITAL SIGNS:  Afebrile, pulse 107, blood pressure 149/76, respirations 20, and

saturation 91% on 2 L nasal cannula. 

GENERAL:  The patient is awake and alert, in no apparent distress. 

LUNGS:  Decent air entry on the left.  There is decreased air entry at the right.

Rhonchi are present.  No prolonged expiratory phase or wheezing is appreciated. 

HEART:  Normal rate and regular. 

ABDOMEN:  Soft, nontender, and nondistended.  Bowel sounds are positive. 

MUSCULOSKELETAL:  No cyanosis or clubbing.  No pitting in the bilateral lower

extremities. 

NEUROLOGIC:  Grossly nonfocal.



LABORATORY DATA:  WBC 10.8, hemoglobin 12.4, and platelets 261,000.  Potassium 3.2.

Basic metabolic profile is otherwise unremarkable.  Procalcitonin is downtrending

gently.  Blood culture has grown coag-negative staph in one out of two.  Influenza A

and B are unremarkable.  Urine culture is negative. 



IMAGING STUDIES:  CT of the chest shows areas of loculation in the right lung.

There is also pneumonia, which seems to be improving a little bit.  There is fatty

liver infiltration. 



ASSESSMENT:  

1. Acute hypoxic respiratory failure, resolved.

2. Community-acquired pneumonia.

3. Pleural effusion.

4. Obstructive sleep apnea, suspected.

5. Fatty liver disease.

6. Severe sepsis, resolved.



DISCUSSION AND PLAN:  I will replace his potassium.  We will make him n.p.o. after

midnight in preparation for decortication tomorrow.  At best, we are dealing with

complicated parapneumonic effusion.  At worse, this is an empyema.  Either way,

surgical intervention is indicated.  Pulmonary/Critical Care will continue to follow

along.  In the outpatient setting, he will need to proceed with a polysomnogram, but

this will obviously be delayed for several weeks. 







Job ID:  [id number]

## 2019-03-02 NOTE — CON
DATE OF CONSULTATION:  03/02/2019



REASON FOR CONSULTATION:  Evaluate the patient with a right multiloculated effusion.



HISTORY OF PRESENT ILLNESS:  Mr. Tee is a 19-year-old male, who presented through

the emergency department with pneumonia.  Since admission, he has accumulated

right-sided effusion, which on CT scan today appears multiloculated.  He also has a

fair amount of consolidation of his right lung.  He has had no fever.  He has had

shortness of breath and cannot really complete sentences without stopping.  His

white blood cell count at the time of admission was 21,000, it is currently 10,000

on Vibramycin.  He has grown coag-negative staph from blood cultures.  Currently, he

is resting comfortably without real complaint. 



PAST MEDICAL HISTORY:  None.



PAST SURGICAL HISTORY:  None.



CURRENT MEDICATIONS:  None.



ALLERGIES:  NONE.



SOCIAL HISTORY:  Works in the oil field.



REVIEW OF SYSTEMS:  A 10-point review of systems is negative except as above.



PHYSICAL EXAMINATION:

GENERAL:  This is a moderately obese gentleman, resting comfortably in his room. 

VITAL SIGNS:  Height 5 feet 9 inches, weight is 235 pounds, temperature is 98.5,

pulse is 107, and blood pressure is 149/76. 

NECK:  Supple without adenopathy. 

LUNGS:  Clear with diminished breath sounds in the right base. 

HEART:  Rhythm is regular without murmur. 

ABDOMEN:  Soft and nontender. 

EXTREMITIES:  No edema.



LABORATORY DATA:  Of note, white blood cell count is 10,000, hemoglobin is 12.4, and

platelet count is 261.  Potassium is 3.2. 



ASSESSMENT AND PLAN:  This is a 19-year-old with right-sided pneumonia, which has

progressed to a probable multiloculated right empyema.  I have discussed

thoracoscopy with decortication and he is agreeable. 







Job ID:  [id number]

## 2019-03-02 NOTE — CT
CT CHEST NONCONTRAST:

 

Date:  03/02/19 

 

HISTORY:  

Empyema. Pleural effusion. 

 

COMPARISON:  

02/27/19. 

 

FINDINGS:

Significant atelectasis of the right lung Mild atelectasis and scarring at the left base. 

 

No evidence of pneumothorax. 

 

Lack of contrast limits evaluation of the soft tissues. Right pleural fluid has increased somewhat, n
ow with fluid extending around the upper and anterior margin of the right lung. A loculated component
 of fluid also lies along the right side of the upper anterior mediastinum. There is some increased d
ensity peripheral to the lobular fluid, suggestive of a developing rind. 

 

Within the partially visualized upper abdomen, liver is diffusely hypodense. 

 

IMPRESSION: 

1.  Interval increase in right pleural fluid, now somewhat loculated around the right lung and at the
 right upper mediastinum. No pleural gas is evident. 

 

2.  Hepatosteatosis. 

 

 

 

 

POS: [facility]

## 2019-03-03 LAB
ANION GAP SERPL CALC-SCNC: 15 MMOL/L (ref 10–20)
BASOPHILS # BLD AUTO: 0.1 THOU/UL (ref 0–0.2)
BASOPHILS NFR BLD AUTO: 1.5 % (ref 0–1)
BUN SERPL-MCNC: 14 MG/DL (ref 8.9–20.6)
CALCIUM SERPL-MCNC: 9.4 MG/DL (ref 7.8–10.44)
CHLORIDE SERPL-SCNC: 105 MMOL/L (ref 98–107)
CO2 SERPL-SCNC: 22 MMOL/L (ref 22–29)
CREAT CL PREDICTED SERPL C-G-VRATE: 212 ML/MIN (ref 70–130)
EOSINOPHIL # BLD AUTO: 0.2 THOU/UL (ref 0–0.7)
EOSINOPHIL NFR BLD AUTO: 2.1 % (ref 0–10)
GLUCOSE SERPL-MCNC: 95 MG/DL (ref 70–105)
HGB BLD-MCNC: 12.9 G/DL (ref 14–18)
LYMPHOCYTES # BLD: 0.9 THOU/UL (ref 1.2–3.4)
LYMPHOCYTES NFR BLD AUTO: 8.8 % (ref 21–51)
MCH RBC QN AUTO: 29.7 PG (ref 27–31)
MCV RBC AUTO: 90.2 FL (ref 78–98)
MONOCYTES # BLD AUTO: 1.2 THOU/UL (ref 0.11–0.59)
MONOCYTES NFR BLD AUTO: 12 % (ref 0–10)
NEUTROPHILS # BLD AUTO: 7.5 THOU/UL (ref 1.4–6.5)
NEUTROPHILS NFR BLD AUTO: 75.7 % (ref 42–75)
PLATELET # BLD AUTO: 330 THOU/UL (ref 130–400)
POTASSIUM SERPL-SCNC: 3.4 MMOL/L (ref 3.5–5.1)
RBC # BLD AUTO: 4.34 MILL/UL (ref 4.7–6.1)
SODIUM SERPL-SCNC: 139 MMOL/L (ref 136–145)
WBC # BLD AUTO: 9.9 THOU/UL (ref 4.8–10.8)

## 2019-03-03 PROCEDURE — 05H533Z INSERTION OF INFUSION DEVICE INTO RIGHT SUBCLAVIAN VEIN, PERCUTANEOUS APPROACH: ICD-10-PCS | Performed by: THORACIC SURGERY (CARDIOTHORACIC VASCULAR SURGERY)

## 2019-03-03 PROCEDURE — 0BNK4ZZ RELEASE RIGHT LUNG, PERCUTANEOUS ENDOSCOPIC APPROACH: ICD-10-PCS | Performed by: THORACIC SURGERY (CARDIOTHORACIC VASCULAR SURGERY)

## 2019-03-03 RX ADMIN — Medication SCH ML: at 20:31

## 2019-03-03 NOTE — PRG
DATE OF SERVICE:  03/03/2019



SERVICE:  Pulmonary Medicine.



SUBJECTIVE:  The patient is doing really well from respiratory standpoint.

Breathing comfortably.  He is status post decortication, postop day 0.  His pain is

actually under fairly decent control.  He does not have any air leak.  Otherwise,

things are going quite well. 



OBJECTIVE:  VITAL SIGNS:  Afebrile, currently with a T-max of 99.8.  Pulse 107,

blood pressure 141/70, respirations 28, saturations 93% on 1 L nasal cannula. 

GENERAL:  The patient is awake and alert, in no apparent distress lungs decent air

entry.  There is no prolonged expiratory phase or wheezing present. 

HEART:  Normal rate regular. 

ABDOMEN:  Soft, nontender, and nondistended.  Bowel sounds positive. 

MUSCULOSKELETAL:  No cyanosis or clubbing.  No pitting in the bilateral lower

extremities. 

NEUROLOGIC:  Grossly nonfocal.



LABORATORY DATA:  WBC 9.9, hemoglobin 12.9, platelets 330,000.  Basic metabolic

profile is otherwise unremarkable except for potassium of 3.4. 



DIAGNOSTIC DATA:  Chest x-ray demonstrates right-sided thoracostomy tube is in good

position.  There has been evacuation of the previous effusion.  Low lung volumes

accentuate interstitial markings.  There is a small amount of fluid in the fissure,

which is prominent. 



ASSESSMENT:  

1. Acute hypoxic respiratory failure.

2. Community-acquired pneumonia.

3. Complicated parapneumonic pleural effusion versus empyema, status post

thoracoscopy, postop day 0. 

4. Fatty liver disease.

5. Severe sepsis, resolving.

6. Obstructive sleep apnea, suspected.



DISCUSSION AND PLAN:  The patient is doing fine from respiratory standpoint.

Supportive measures will be continued.  The thoracostomy tube will remain in place

until he stops putting some fluid out.  We will leave him on his current

antibiotics.  In a couple of days, we will likely be able to deescalate him over to

p.o. augmented and complete a 14-day course of antibiotic from his procedure. 







Job ID:  [id number]

## 2019-03-03 NOTE — PDOC.FM
- Subjective


Subjective: 





Seen at bedside resting comfortably, no acute distress. No significant events 

over night. Was seen by CV surg yesterday with surgery planned for today to 

remove effusion. 





- Objective


MAR Reviewed: Yes


Vital Signs & Weight: 


 Vital Signs (12 hours)











  Temp Pulse Resp BP BP Pulse Ox


 


 03/03/19 03:30  98.1 F  88  16   125/73  92 L


 


 03/02/19 19:55  100.2 F H  122 H  20  130/72   92 L








 Weight











Weight                         106.594 kg














I&O: 


 











 03/01/19 03/02/19 03/03/19





 06:59 06:59 06:59


 


Intake Total 2400  1570


 


Output Total   1750


 


Balance 2400  -180











Result Diagrams: 


 03/03/19 05:55





 03/03/19 05:55





Phys Exam





- Physical Examination


Constitutional: NAD


HEENT: moist MMs


Neck: no JVD


Rales in RML and RLL, no change from yesterday


Cardiovascular: no significant murmur


Gastrointestinal: soft, non-tender, no distention, positive bowel sounds


Musculoskeletal: no edema


Neurological: moves all 4 limbs


Psychiatric: A&O x 3


Skin: no rash, normal turgor





Dx/Plan


(1) Acute hypoxemic respiratory failure


Code(s): J96.01 - ACUTE RESPIRATORY FAILURE WITH HYPOXIA   Status: Acute   





(2) Costochondral chest pain


Code(s): R07.1 - CHEST PAIN ON BREATHING   Status: Acute   





(3) Dehydration


Code(s): E86.0 - DEHYDRATION   Status: Resolved   





(4) Fatty liver


Code(s): K76.0 - FATTY (CHANGE OF) LIVER, NOT ELSEWHERE CLASSIFIED   Status: 

Chronic   





(5) Sepsis due to pneumonia


Code(s): J18.9 - PNEUMONIA, UNSPECIFIED ORGANISM; A41.9 - SEPSIS, UNSPECIFIED 

ORGANISM   Status: Acute   





(6) Parapneumonic effusion


Code(s): J18.9 - PNEUMONIA, UNSPECIFIED ORGANISM; J91.8 - PLEURAL EFFUSION IN 

OTHER CONDITIONS CLASSIFIED ELSEWHERE   Status: Acute   





(7) Hypokalemia


Code(s): E87.6 - HYPOKALEMIA   Status: Acute   





- Plan


Plan: 





1. Acute Hypoxemic Respiratory Failure 2/2 Sepsis due to RLL Pneumonia


- Currently requiring 1L by NC


- CT yesterday showed worsening and likely loculated effusion, surgical 

treatment today


- Continue Zosyn


- Strep ag neg


- Blood culture shows 1/2 coagulase neg staph, assumed contaminate. Urine cx 

negative 


- duonebs PRN q2h





2. Costochondritis


- prn oral NSAIDs





3. Moderate dehydration, resolved





4. Fatty Liver


- incidental finding,  out weight loss. Will need outpatient work up 





5. Parapneumonic effusion


-as above





6. Hypokalemia 


- replace PO





Dispo: patient stable, further hospital course pending surgical results.





Addendum - Attending





- Attending Attestation


Date/Time: 03/03/19 2167





I personally evaluated the patient and discussed the management with Dr. Nielsen.


I agree with the History, Examination, Assessment and Plan documented above 

with any addition or exceptions noted below.


Pt seen prior to going to [facility] this morning for decortication with Dr. Muir.  He notes mild shortness of breath.

## 2019-03-03 NOTE — RAD
CHEST 1 VIEW:

 

Date:  03/03/19 

 

HISTORY:  

Empyema. Chest surgery. 

 

COMPARISON:  

03/02/19. 

 

FINDINGS:

Cardiac silhouette is magnified by projection. Pulmonary vasculature is slightly engorged and accentu
ated by shallow inspiration. Mediastinum is midline. Two large caliber right thoracostomy tubes are i
n place, directed towards the right base and right apex. Small amount of right pleural fluid remains.
 Tip of a right subclavian central venous catheter projects over the cavoatrial junction. 

 

IMPRESSION: 

1.  Right thoracostomy tubes in place. No residual pneumothorax. Small amount of right pleural fluid.
 

 

2.  Right subclavian central venous catheter is in good radiographic position. 

 

 

POS: [facility]

## 2019-03-03 NOTE — OP
DATE OF PROCEDURE:  03/03/2019



PREOPERATIVE DIAGNOSIS:  Right empyema.



POSTOPERATIVE DIAGNOSIS:  Right empyema.



PROCEDURES PERFORMED:  

1. Right subclavian central line placement.

2. Right thoracoscopy with total pulmonary decortication.



ANESTHESIA:  General endotracheal-Dr. Stephen Villa.



ESTIMATED BLOOD LOSS:  Less than 100.



DRAINS:  32-Guyanese chest tubes x2.



SPECIMENS:  Fluid sent for culture.



DESCRIPTION OF PROCEDURE:  After consent was obtained, the patient was brought to

the operating room and placed in supine position on the operating room table.

Appropriate lines and monitors were placed, and general endotracheal anesthesia was

induced.  A right subclavian central line was placed for venous access.  This was

secured with silk sutures of the skin, flushed and flowed nicely.  The patient was

placed in a left lateral decubitus position.  Joints were appropriately padded.

SCDs were used.  The right chest wall was prepped and draped in usual sterile

fashion.  Three separate skin incisions were made for port access.  The suction was

inserted and approximately 500 mL of fluid was evacuated.  There was a significant

amount of gelatinous material encasing both parietal and visceral pleura.  This was

debrided.  Chest was copiously irrigated.  Two 32-Guyanese chest tubes were placed.

The lung expanded nicely and filled the chest cavity.  The third port site was

closed in layers.  0.5% Marcaine was used to infiltrate around the two chest tubes

and in the third port site.  The patient tolerated the procedure well, was awakened,

extubated, and transferred to the recovery room in stable condition. 





Job ID:  [id number]

## 2019-03-04 LAB
ANION GAP SERPL CALC-SCNC: 13 MMOL/L (ref 10–20)
BASOPHILS # BLD AUTO: 0 THOU/UL (ref 0–0.2)
BASOPHILS NFR BLD AUTO: 0.2 % (ref 0–1)
BUN SERPL-MCNC: 13 MG/DL (ref 8.9–20.6)
CALCIUM SERPL-MCNC: 8.9 MG/DL (ref 7.8–10.44)
CHLORIDE SERPL-SCNC: 103 MMOL/L (ref 98–107)
CO2 SERPL-SCNC: 24 MMOL/L (ref 22–29)
CREAT CL PREDICTED SERPL C-G-VRATE: 221 ML/MIN (ref 70–130)
EOSINOPHIL # BLD AUTO: 0.1 THOU/UL (ref 0–0.7)
EOSINOPHIL NFR BLD AUTO: 1.3 % (ref 0–10)
GLUCOSE SERPL-MCNC: 105 MG/DL (ref 70–105)
HGB BLD-MCNC: 12 G/DL (ref 14–18)
LYMPHOCYTES # BLD: 1 THOU/UL (ref 1.2–3.4)
LYMPHOCYTES NFR BLD AUTO: 8.6 % (ref 21–51)
MCH RBC QN AUTO: 29.3 PG (ref 27–31)
MCV RBC AUTO: 90 FL (ref 78–98)
MONOCYTES # BLD AUTO: 1.4 THOU/UL (ref 0.11–0.59)
MONOCYTES NFR BLD AUTO: 12.4 % (ref 0–10)
NEUTROPHILS # BLD AUTO: 8.7 THOU/UL (ref 1.4–6.5)
NEUTROPHILS NFR BLD AUTO: 77.6 % (ref 42–75)
PLATELET # BLD AUTO: 354 THOU/UL (ref 130–400)
POTASSIUM SERPL-SCNC: 3.9 MMOL/L (ref 3.5–5.1)
RBC # BLD AUTO: 4.1 MILL/UL (ref 4.7–6.1)
SODIUM SERPL-SCNC: 136 MMOL/L (ref 136–145)
WBC # BLD AUTO: 11.2 THOU/UL (ref 4.8–10.8)

## 2019-03-04 RX ADMIN — Medication PRN ML: at 21:06

## 2019-03-04 RX ADMIN — Medication SCH ML: at 08:30

## 2019-03-04 RX ADMIN — Medication SCH ML: at 21:05

## 2019-03-04 NOTE — PDOC.FM
- Subjective


Subjective: 





Seen at bedside this morning resting comfortably s/p pulmonary decortication 

and R subclavian CVC placement POD 1. Minimal pain. No shortness of breath. No 

acute events over night. Patient states that he is breathing better today than 

yesterday 





- Objective


MAR Reviewed: Yes


Vital Signs & Weight: 


 Vital Signs (12 hours)











  Temp Pulse Resp BP Pulse Ox


 


 03/04/19 03:35  97.6 F  90  18  131/70  93 L


 


 03/03/19 20:25  99.7 F H  118 H  18  137/67  93 L








 Weight











Weight                         106.594 kg














I&O: 


 











 03/03/19 03/04/19 03/05/19





 06:59 06:59 06:59


 


Intake Total 1570 1900 


 


Output Total 1750 425 


 


Balance -180 1475 











Result Diagrams: 


 03/04/19 08:42





 03/04/19 08:42





Phys Exam





- Physical Examination


Constitutional: NAD


HEENT: moist MMs


Neck: no JVD


Improved breath sounds on R, some decreased air movment


Cardiovascular: RRR, no significant murmur


Gastrointestinal: soft, non-tender, no distention


Musculoskeletal: no edema


Neurological: moves all 4 limbs


Psychiatric: A&O x 3


Deviation from normal: 2 chest tubes in place on R midaxillary line. Non tender 

and clean





Dx/Plan


(1) Acute hypoxemic respiratory failure


Code(s): J96.01 - ACUTE RESPIRATORY FAILURE WITH HYPOXIA   Status: Acute   





(2) Costochondral chest pain


Code(s): R07.1 - CHEST PAIN ON BREATHING   Status: Acute   





(3) Dehydration


Code(s): E86.0 - DEHYDRATION   Status: Resolved   





(4) Fatty liver


Code(s): K76.0 - FATTY (CHANGE OF) LIVER, NOT ELSEWHERE CLASSIFIED   Status: 

Chronic   





(5) Sepsis due to pneumonia


Code(s): J18.9 - PNEUMONIA, UNSPECIFIED ORGANISM; A41.9 - SEPSIS, UNSPECIFIED 

ORGANISM   Status: Acute   





(6) Parapneumonic effusion


Code(s): J18.9 - PNEUMONIA, UNSPECIFIED ORGANISM; J91.8 - PLEURAL EFFUSION IN 

OTHER CONDITIONS CLASSIFIED ELSEWHERE   Status: Acute   





(7) Hypokalemia


Code(s): E87.6 - HYPOKALEMIA   Status: Acute   





- Plan


Plan: 





1. Acute Hypoxemic Respiratory Failure 2/2 Sepsis due to RLL Pneumonia, resolved


- s/p decortication POD 1. Doing well and breathing easier today. 


- Continue Zosyn until chest tubes are pulled, then change to augmentin for 14 

total days


- tachycardia has improved





2. Costochondritis


- prn oral NSAIDs





3. Moderate dehydration, resolved





4. Fatty Liver


- incidental finding,  out weight loss. Will need outpatient work up 





5. Parapneumonic effusion, resolved


-as above





6. Hypokalemia 


- labs pending at this time, will replace if needed





Dispo: patient stable continue to monitor on tele, would expect 2-3 days of 

hospitalization pending surgical recovery





Addendum - Attending





- Attending Attestation


Date/Time: 03/04/19 7521





I personally evaluated the patient and discussed the management with Dr. Nielsen


I agree with the History, Examination, Assessment and Plan documented above 

with any addition or exceptions noted below- Patient without complaints. States 

that he is feeling better. Afebrile VSS. A/P: 1) Severe sepsis- resolving. 2) 

CAP with empyema- s/p decortication- POD#1- continue abx; cultures negative to 

date. Continue chest tube drainage.

## 2019-03-04 NOTE — PRG
DATE OF SERVICE:  03/04/2019



SERVICE:  Pulmonary Medicine.



INTERVAL HISTORY:  The patient is doing fine from respiratory standpoint.  Denies

any current chest pain, fevers, or chills.  Otherwise, there has been no interval

change to his condition.  His pain is under very good control.  He has had very

little output from the chest tube. 



PHYSICAL EXAMINATION:

VITAL SIGNS:  Afebrile.  Pulse 76, blood pressure 150/82, respirations 16,

saturation 94% on room air. 

GENERAL:  The patient is awake and alert, in no apparent distress. 

LUNGS:  Excellent air entry.  There is good air movement in bilateral lung fields.

Some minimal rhonchi are present on the right. 

HEART:  Normal rate, regular. 

ABDOMEN:  Soft, nontender, nondistended.  Bowel sounds are positive. 

MUSCULOSKELETAL:  No cyanosis or clubbing.  No pitting in the bilateral lower

extremities. 

NEUROLOGIC:  Grossly nonfocal.



LABORATORY DATA:  WBC 11.2, hemoglobin 12.0, and platelets 354,000.  Basic metabolic

profile is otherwise unremarkable.  Bacterial culture, influenza A and B, urine

culture, blood culture x1 is unremarkable.  The other one likely has some

contaminants in it. 



IMAGING DATA:  Chest x-ray demonstrates stable thoracostomy tube in good expansion

of the right lung.  There has not been a significant interval change. 



ASSESSMENT:  

1. Acute hypoxic respiratory failure, resolved.

2. Community-acquired pneumonia.

3. Complicated parapneumonic pleural effusion versus empyema, status post

thoracoscopy, postop day 1. 

4. Obstructive sleep apnea, suspected.

5. Fatty liver disease.

6. Severe sepsis, resolved.



DISCUSSION AND PLAN:  I will continue our current course of action.  Once the chest

tubes are removed, we can convert him over to p.o. antibiotics.  I would prefer

Augmentin.  I will give him 2-week course from the date of his procedure.  I will

have him return to clinic in the outpatient setting with a repeat chest x-ray.  At

that point, I would likely set him up with an outpatient polysomnogram.  Pulmonary

Critical Care will continue to follow so long as he remains inhouse. 







Job ID:  [id number]

## 2019-03-04 NOTE — RAD
SINGLE VIEW CHEST:

 

HISTORY:

Status post thoracotomy.

 

COMPARISON:

03/03/2019

 

FINDINGS:

A single view of the chest shows a normal sized cardiomediastinal silhouette.  Lines and tubes are un
changed in position.  Opacities are seen in the right thorax, which likely represent a moderate right
 pleural effusion with adjacent atelectasis.

 

IMPRESSION:

Stable examination.

 

POS: [facility]

## 2019-03-05 VITALS — SYSTOLIC BLOOD PRESSURE: 143 MMHG | TEMPERATURE: 98 F | DIASTOLIC BLOOD PRESSURE: 92 MMHG

## 2019-03-05 RX ADMIN — Medication PRN ML: at 06:39

## 2019-03-05 RX ADMIN — Medication SCH ML: at 00:24

## 2019-03-05 RX ADMIN — Medication SCH ML: at 08:25

## 2019-03-05 NOTE — PDOC.FM
- Subjective


Subjective: 





Seen at bedside this morning. States that SOB is improving. Denies cough or 

chest pain. He has been ambulating minimally. No acute events over night. Chest 

tubes were taken off of suction yesterday. 





- Objective


MAR Reviewed: Yes


Vital Signs & Weight: 


 Vital Signs (12 hours)











  Temp Pulse Resp BP Pulse Ox


 


 03/05/19 04:40  97.1 F L  79  16  122/73  95


 


 03/05/19 00:22  101 F H  98  16  129/75  93 L


 


 03/04/19 21:06      95


 


 03/04/19 20:59  99.6 F  92  16  133/82  95








 Weight











Weight                         104.825 kg














I&O: 


 











 03/04/19 03/05/19 03/06/19





 06:59 06:59 06:59


 


Intake Total 2860 1717 


 


Output Total 965 2120 


 


Balance 1895 -403 











Result Diagrams: 


 03/04/19 08:42





 03/04/19 08:42





Phys Exam





- Physical Examination


Constitutional: NAD


HEENT: moist MMs


Neck: no JVD


Minimal rales in R lung, decreased air movement. Similar to previous day


L lung CTA


Cardiovascular: RRR, no significant murmur


Gastrointestinal: soft, non-tender


Musculoskeletal: no edema


Neurological: moves all 4 limbs


Psychiatric: A&O x 3


Skin: no rash





Dx/Plan


(1) Acute hypoxemic respiratory failure


Code(s): J96.01 - ACUTE RESPIRATORY FAILURE WITH HYPOXIA   Status: Acute   





(2) Costochondral chest pain


Code(s): R07.1 - CHEST PAIN ON BREATHING   Status: Acute   





(3) Dehydration


Code(s): E86.0 - DEHYDRATION   Status: Resolved   





(4) Fatty liver


Code(s): K76.0 - FATTY (CHANGE OF) LIVER, NOT ELSEWHERE CLASSIFIED   Status: 

Chronic   





(5) Sepsis due to pneumonia


Code(s): J18.9 - PNEUMONIA, UNSPECIFIED ORGANISM; A41.9 - SEPSIS, UNSPECIFIED 

ORGANISM   Status: Acute   





(6) Parapneumonic effusion


Code(s): J18.9 - PNEUMONIA, UNSPECIFIED ORGANISM; J91.8 - PLEURAL EFFUSION IN 

OTHER CONDITIONS CLASSIFIED ELSEWHERE   Status: Acute   





(7) Hypokalemia


Code(s): E87.6 - HYPOKALEMIA   Status: Acute   





- Plan


Plan: 





1. Acute Hypoxemic Respiratory Failure 2/2 Sepsis due to RLL Pneumonia, resolved


- s/p decortication POD 2. CXR shows improvement from yesterday


- Continue Zosyn until chest tubes are pulled, then change to augmentin for 14 

total days. Now day 2





2. Costochondritis, resolved





3. Moderate dehydration, resolved





4. Fatty Liver


- incidental finding,  out weight loss. Will need outpatient work up 





5. Parapneumonic effusion, resolved


-as above





6. Hypokalemia, resolved





7. LILLIAN, suspected


- follow up with pulm outpatient





Dispo: patient stable continue to monitor on tele, would expect 2-3 days of 

hospitalization pending surgical recommendation. Will need outpatient follow up 

with pulm 





Addendum - Attending





- Attending Attestation


Date/Time: 03/05/19 1014





I personally evaluated the patient and discussed the management with Dr. Nielsen


I agree with the History, Examination, Assessment and Plan documented above 

with any addition or exceptions noted below - Patient without complaints. [id number] 

VSS. A/P: CAP with empyema - continue IV abx. Continue chest tubes as per 

pulmonary and CV surgery.

## 2019-03-05 NOTE — RAD
CHEST ONE VIEW:

 

HISTORY:

Chest surgery.  Followup.

 

COMPARISON:

03/04/2019

 

FINDINGS:

The cardiac silhouette is magnified by projection.  The pulmonary vasculature remains engorged with w
idespread reticulonodular interstitial prominence and bilateral perihilar infiltrate.  The mediastinu
m is midline.  Right thoracostomy tubes and a central venous catheter are in place.  Some residual ri
ght pleural fluid and patchy infiltrate throughout the right lung are similar in appearance to the pr
ior study.

 

IMPRESSION:

Stable radiographic appearance of the chest.

 

POS: [facility]

## 2019-03-06 NOTE — DIS
DATE OF ADMISSION:  02/27/2019



DATE OF DISCHARGE:  03/05/2019



ADMITTING ATTENDING:  Dee Lunsford MD.



DISCHARGE ATTENDING:  Dee Lunsford MD.



RESIDENT:  Camilo Nielsen DO.



CONSULTS:  

1. Dr. Miki Daly of Pulmonology.

2. Dr. Cornelio Muir of Cardiovascular Surgery.



PROCEDURES:  

1. On 02/27, chest x-ray, finding, no evidence of acute cardiopulmonary process.  

2. Chest CTA on 02/27, finding, mild right-sided pleural effusion with atelectatic

changes, diffuse fatty changes in the visualized portion of the liver.  No signs of

pulmonary emboli. 

3. Chest x-ray on 03/01, finding, moderate right pleural effusion with adjacent

atelectasis versus infiltrate. 

4. Chest CT on 03/02, finding, interval increase in right pleural fluid, somewhat

loculated at the right upper mediastinum.  No pleural gas. 

5. Echo on 03/02, finding, EF of 50% to 55%, left atrium mild dilation, mild

tricuspid regurg, otherwise normal. 

6. On 03/03, right thoracoscopy with total pulmonary decortication and right

subclavian central line placement. 

7. Chest x-ray on 03/03, findings, right-sided thoracostomy tube is in place.  No

new pneumothorax.  Small amount of right pleural fluid. 

8. Chest x-ray on 03/04, normal size cardiomediastinal silhouette.  Chest tubes in

unchanged position.  Opacities in the right thorax, which likely represent moderate

right pleural effusion with adjacent atelectasis. 

9. Chest x-ray on 03/05, stable radiographic appearance of the chest.



ADMITTING DIAGNOSES:  

1. Community-acquired pneumonia with parapneumonic effusion.

2. Sepsis secondary to pneumonia.

3. Acute hypoxemic respiratory failure.



SECONDARY DIAGNOSES:  

1. Costochondral chest pain.

2. Fatty liver.

3. Dehydration.

4. Hypokalemia.



DISCHARGE MEDICATIONS:  

1. Tylenol No. 3 one tablet p.o. q.6 p.r.n. pain.  

2. Flexeril 10 mg p.o. t.i.d. p.r.n. muscle spasm. 

3. Augmentin 875 mg p.o. q.12 x12 days.



HOSPITAL COURSE:  This is a 19-year-old male who was admitted for sepsis secondary

to right-sided community-acquired pneumonia.  The patient was started initially on

broad-spectrum antibiotics of vanc and Zosyn.  On 2nd day of admission, it was noted

that it appeared to be a forming pleural effusion.  On the 3rd day, the effusion did

markedly worse and Pulmonary Critical Care was consulted.  On this day, antibiotics

were moved to Zosyn.  The following day, repeat CT showed worsening of the pleural

effusion and apparent loculation.  It was determined at that time he was appropriate

for a CV surgical consultation for decortication.  This was set up for the following

day.  The procedure was completed without complication.  For the subsequent 3 days,

chest tubes remained in place with very minimal output while on suction.  By postop

day #3, chest tubes were removed and all signs and symptoms have resolved.  The

patient was moved over to p.o. Augmentin from Zosyn and discharged home with a 12

additional days of antibiotics for a total of 15-day course.  The patient was given

instructions to follow up with Dr. Daly in 2 weeks following discharge.  It was

incidentally noted that the patient had fatty infiltrate of the liver.  This is

likely secondary to the patient's body habitus.  It was recommended that the patient

is to follow up with the PCP for workup and further monitoring. 



Additionally, due to the patient's habitus and severity of pneumonia, it was felt

likely the patient has obstructive sleep apnea as a possible source of this type of

infection related to aspiration.  This will be addressed by Dr. Daly in the

outpatient setting upon 2-week followup. 



DISCHARGE INSTRUCTIONS:  

1. Location:  Home.

2. Diet:  Regular.

3. Activity:  Ad smiley.  

4. Followup:  Follow up with PCP in 1 week.  Follow up with Dr. Daly within 2

weeks. 







Job ID:  [id number]

## 2019-03-06 NOTE — PRG
DATE OF SERVICE:  03/05/2019



SERVICE:  Pulmonary Medicine.



INTERVAL HISTORY:  The patient is doing fine from respiratory standpoint.  He is

breathing comfortably.  Chest tube was removed this morning.  He denies any fevers

or chills.  Otherwise, he is ready to go home today. 



PHYSICAL EXAMINATION:

VITAL SIGNS:  Afebrile, pulse 71, blood pressure 143/92, respirations 14, and

saturation 95% on room air. 

GENERAL:  The patient is awake and alert, in no apparent distress. 

LUNGS:  Excellent air entry.  There is no prolonged expiratory phase or wheezing

present. 

HEART:  Normal rate.  Regular. 

ABDOMEN:  Soft, nontender, and nondistended.  Bowel sounds are positive. 

MUSCULOSKELETAL:  No cyanosis or clubbing.  No pitting in the bilateral lower

extremities. 

NEUROLOGIC:  Grossly nonfocal.



IMAGING:  Chest x-ray demonstrates stable radiographic appearance of the chest.  At

this point, the right thoracostomy tubes and central lines were in good position. 



ASSESSMENT:  

1. Acute hypoxic respiratory failure, resolved.

2. Community-acquired pneumonia.

3. Complicated parapneumonic effusion versus empyema, status post thoracoscopy,

postop day 2. 

4. Obstructive sleep apnea, suspected.

5. Fatty liver disease.

6. Severe sepsis, resolved.



DISCUSSION AND PLAN:  The patient is doing absolutely wonderful from respiratory

standpoint.  The chest tubes have been removed.  He needs a 10 additional days of

Augmentin.  I will see him in 2 weeks in the outpatient setting with a repeat chest

x-ray. 

At that point, we will arrange for him to undergo a polysomnogram.

Pulmonary/Critical Care will continue to follow along.  In the outpatient setting,

we will discuss weight and what the impact of fatty liver can be through time given

his ARR.  At his current age, he has plenty of time to prevent chronic liver

disease. 







Job ID:  [id number]

## 2019-03-20 ENCOUNTER — HOSPITAL ENCOUNTER (OUTPATIENT)
Dept: HOSPITAL 92 - RAD | Age: 19
Discharge: HOME | End: 2019-03-20
Attending: THORACIC SURGERY (CARDIOTHORACIC VASCULAR SURGERY)
Payer: COMMERCIAL

## 2019-03-20 DIAGNOSIS — J86.9: Primary | ICD-10-CM

## 2019-03-20 PROCEDURE — 71046 X-RAY EXAM CHEST 2 VIEWS: CPT

## 2019-03-20 NOTE — RAD
PA AND LATERAL CHEST:

 

HISTORY:

Empyema of the right pleural space.

 

COMPARISON:

03/05/2019

 

FINDINGS:

There is continued elevation of the right hemidiaphragm.  The right-sided chest tube has been removed
 in the interim.  The heart size is normal.  The left lung is clear.  No large pleural effusions are 
seen.

 

POS: [facility]